# Patient Record
Sex: MALE | Race: WHITE | NOT HISPANIC OR LATINO | Employment: STUDENT | URBAN - METROPOLITAN AREA
[De-identification: names, ages, dates, MRNs, and addresses within clinical notes are randomized per-mention and may not be internally consistent; named-entity substitution may affect disease eponyms.]

---
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
## 2023-06-09 ENCOUNTER — APPOINTMENT (OUTPATIENT)
Dept: GENERAL RADIOLOGY | Facility: CLINIC | Age: 18
End: 2023-06-09
Attending: STUDENT IN AN ORGANIZED HEALTH CARE EDUCATION/TRAINING PROGRAM
Payer: COMMERCIAL

## 2023-06-09 ENCOUNTER — HOSPITAL ENCOUNTER (EMERGENCY)
Facility: CLINIC | Age: 18
Discharge: HOME | End: 2023-06-09
Attending: STUDENT IN AN ORGANIZED HEALTH CARE EDUCATION/TRAINING PROGRAM | Admitting: STUDENT IN AN ORGANIZED HEALTH CARE EDUCATION/TRAINING PROGRAM
Payer: COMMERCIAL

## 2023-06-09 VITALS
HEART RATE: 76 BPM | SYSTOLIC BLOOD PRESSURE: 127 MMHG | HEIGHT: 75 IN | TEMPERATURE: 97.8 F | DIASTOLIC BLOOD PRESSURE: 69 MMHG | RESPIRATION RATE: 16 BRPM

## 2023-06-09 DIAGNOSIS — S61.311A LACERATION OF LEFT INDEX FINGER WITHOUT FOREIGN BODY WITH DAMAGE TO NAIL, INITIAL ENCOUNTER: ICD-10-CM

## 2023-06-09 PROCEDURE — 12002 RPR S/N/AX/GEN/TRNK2.6-7.5CM: CPT | Performed by: STUDENT IN AN ORGANIZED HEALTH CARE EDUCATION/TRAINING PROGRAM

## 2023-06-09 PROCEDURE — 250N000011 HC RX IP 250 OP 636: Performed by: STUDENT IN AN ORGANIZED HEALTH CARE EDUCATION/TRAINING PROGRAM

## 2023-06-09 PROCEDURE — 90471 IMMUNIZATION ADMIN: CPT | Performed by: STUDENT IN AN ORGANIZED HEALTH CARE EDUCATION/TRAINING PROGRAM

## 2023-06-09 PROCEDURE — 73140 X-RAY EXAM OF FINGER(S): CPT | Mod: 26 | Performed by: RADIOLOGY

## 2023-06-09 PROCEDURE — 99283 EMERGENCY DEPT VISIT LOW MDM: CPT | Mod: 25 | Performed by: STUDENT IN AN ORGANIZED HEALTH CARE EDUCATION/TRAINING PROGRAM

## 2023-06-09 PROCEDURE — 99284 EMERGENCY DEPT VISIT MOD MDM: CPT | Mod: 25 | Performed by: STUDENT IN AN ORGANIZED HEALTH CARE EDUCATION/TRAINING PROGRAM

## 2023-06-09 PROCEDURE — 73140 X-RAY EXAM OF FINGER(S): CPT | Mod: LT

## 2023-06-09 PROCEDURE — 90715 TDAP VACCINE 7 YRS/> IM: CPT | Performed by: STUDENT IN AN ORGANIZED HEALTH CARE EDUCATION/TRAINING PROGRAM

## 2023-06-09 RX ORDER — DEXTROAMPHETAMINE SACCHARATE, AMPHETAMINE ASPARTATE, DEXTROAMPHETAMINE SULFATE AND AMPHETAMINE SULFATE 7.5; 7.5; 7.5; 7.5 MG/1; MG/1; MG/1; MG/1
40 TABLET ORAL DAILY
COMMUNITY

## 2023-06-09 RX ORDER — CEPHALEXIN 500 MG/1
500 CAPSULE ORAL 4 TIMES DAILY
Qty: 28 CAPSULE | Refills: 0 | Status: SHIPPED | OUTPATIENT
Start: 2023-06-09 | End: 2023-06-16

## 2023-06-09 RX ADMIN — CLOSTRIDIUM TETANI TOXOID ANTIGEN (FORMALDEHYDE INACTIVATED), CORYNEBACTERIUM DIPHTHERIAE TOXOID ANTIGEN (FORMALDEHYDE INACTIVATED), BORDETELLA PERTUSSIS TOXOID ANTIGEN (GLUTARALDEHYDE INACTIVATED), BORDETELLA PERTUSSIS FILAMENTOUS HEMAGGLUTININ ANTIGEN (FORMALDEHYDE INACTIVATED), BORDETELLA PERTUSSIS PERTACTIN ANTIGEN, AND BORDETELLA PERTUSSIS FIMBRIAE 2/3 ANTIGEN 0.5 ML: 5; 2; 2.5; 5; 3; 5 INJECTION, SUSPENSION INTRAMUSCULAR at 11:17

## 2023-06-09 ASSESSMENT — ACTIVITIES OF DAILY LIVING (ADL): ADLS_ACUITY_SCORE: 35

## 2023-06-09 NOTE — DISCHARGE INSTRUCTIONS
Thank you for coming to the [facility] emergency department.  You were seen for a laceration of your left index finger.  This is a deep laceration and essentially represented a partial amputation of the digit.  The wound was cleaned and closed.  You were started on prophylactic antibiotics.  You were referred to the hand specialist for wound follow-up.    Suture removal in 10-12 days. You can return to the ED for this or in be seen in clinic.    Hand Follow Up: The [facility]  will call you to coordinate your care as prescribed by your provider. A representative will call you within 2 business days to help you schedule your appointment, or you may contact the  Representative at: [phone].

## 2023-06-09 NOTE — ED PROVIDER NOTES
"    [facility]ERGENC[facility] DEPARTMENT ([facility])    6/09/23       ED PROVIDER NOTE    History     Chief Complaint   Patient presents with     Laceration     The history is provided by the patient and medical records.     Shyam Perez is a 17 year old male with no significant past medical history who presents to the ED for a laceration of the distal phalanx of the left index finger.  The patient was cutting onions last night when he accidentally cut the distal phalanx of his left index finger, partially on the palmar aspect with some injury to the nail. He bandaged the wound overnight. 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 They referred him to a UC/ED for further evaluation due to the depth of the wound. Here, he rates the pain as a 5/10.  There is some oozing present.  He denies history of bleeding disorders or other pertinent medical history.  He believes his tetanus is up-to-date. Per [facility] records, last Tdap was 06/30/2015. Knife was relatively clean, only used on an onion.    Past Medical History  No past medical history on file.  No past surgical history on file.  amphetamine-dextroamphetamine (ADDERALL) 30 MG tablet  cephALEXin (KEFLEX) 500 MG capsule      Allergies   Allergen Reactions     Sulfa Antibiotics Hives     Family History  No family history on file.  Social History       Past medical history, past surgical history, medications, allergies, family history, and social history were reviewed with the patient. No additional pertinent items.      A medically appropriate review of systems was performed with pertinent positives and negatives noted in the HPI, and all other systems negative.    Physical Exam   BP: 127/69  Pulse: 76  Temp: 97.8  F (36.6  C)  Resp: 16  Height: 190.5 cm (6' 3\")  Physical Exam  Vital Signs Reviewed  Gen: Well nourished, well developed, resting comfortably, no acute distress  HEENT: NC/AT, " PERRL, EOMI, MMM  Neck: Supple, FROM  CV: Regular Rate, no murmur/rub/gallop  Lungs/Chest: Normal Effort, CTAB  Abd: Non-distended, non-tender  MSK/Back: FROM.  Deep laceration to left fingertip.  Sensation grossly intact to the tip, but slightly diminished over the radial aspect of the pad.  Neuro: A&Ox3, GCS 15, CN II-XII unremarkable  Skin: Warm, Dry, Intact, no visible lesions    ED Course, Procedures, & Data       Patient seen and evaluated in triage room  Moved vertical triage for repair  Tetanus updated  X-ray obtained and reviewed  Referral placed to orthopedic hand clinic for follow-up  Wound care discussed  Stable to discharge with outpatient follow-up    [facility]    -Laceration Repair    Date/Time: 6/9/2023 10:21 AM    Performed by: Gil Clay MD  Authorized by: Gil Clay MD    Risks, benefits and alternatives discussed.      ANESTHESIA (see MAR for exact dosages):     Anesthesia method:  Nerve block    Block location:  Left 2nd digit - digital block    Block needle gauge:  27 G    Block anesthetic:  Lidocaine 1% w/o epi    Block technique:  Ring Block    Block injection procedure:  Anatomic landmarks identified, introduced needle, incremental injection and negative aspiration for blood    Block outcome:  Anesthesia achieved      LACERATION DETAILS     Location:  Finger    Finger location:  L index finger    Length (cm):  4    Depth (mm):  5    REPAIR TYPE:     Repair type:  Intermediate      EXPLORATION:     Hemostasis achieved with:  Direct pressure and tourniquet    Wound exploration: entire depth of wound probed and visualized      Contaminated: no      TREATMENT:     Area cleansed with:  Saline    Amount of cleaning:  Standard    Irrigation solution:  Sterile saline    Irrigation method:  Syringe    SKIN REPAIR     Repair method:  Sutures    Suture size:  4-0    Suture material:  Nylon    Suture technique:  Simple interrupted    Number  of sutures:  5 (4 in fingerpad, 1 in nailbed)    APPROXIMATION     Approximation:  Close    POST-PROCEDURE DETAILS     Dressing:  Non-adherent dressing, splint for protection and sterile dressing        PROCEDURE  Describe Procedure: Wound repaired with 4 nylon sutures in the finger pad, 1 through the nailbed.  Nailbed coated in skin adhesive afterwards for additional closure.  Patient Tolerance:  Patient tolerated the procedure well with no immediate complications                       No results found for any visits on 06/09/23.  Medications   lidocaine 1 % 2 mL (has no administration in time range)   Tdap (tetanus-diphtheria-acell pertussis) (ADACEL) injection 0.5 mL (has no administration in time range)   skin closure adhesive (DERMABOND) vial (has no administration in time range)     Labs Ordered and Resulted from Time of ED Arrival to Time of ED Departure - No data to display  Fingers XR, 2-3 views, left    (Results Pending)          Critical care was not performed.     Medical Decision Making  The patient's presentation was of moderate complexity (an acute complicated injury).    The patient's evaluation involved:  ordering and/or review of 1 test(s) in this encounter (see separate area of note for details)  independent interpretation of testing performed by another health professional (see separate area of note for details)    The patient's management necessitated moderate risk (prescription drug management including medications given in the ED) and moderate risk (a decision regarding minor procedure (laceration repair) with risk factors of none).      Assessment & Plan    Patient is an otherwise healthy 17-year-old male presenting the emergency department with a laceration to left fingertip.  Patient is right-hand dominant. He is a decathlete. He has no other injuries.  Vital signs are reassuring.  The laceration is deep and essentially represents a partial amputation of the radial aspect of the left second  fingertip.  Wound was cleaned and irrigated.  Closed as above.  Prophylactic antibiotic started.  I independently interpreted the x-ray and noted no obvious fracture/underlying bony image.  No radiopaque foreign bodies visualized. Final read pending.    Referral was placed to the orthopedic hand clinic for follow-up.  Wound care instructions provided.  Keflex prescribed.  Return precautions provided.    I have reviewed the nursing notes. I have reviewed the findings, diagnosis, plan and need for follow up with the patient.    New Prescriptions    CEPHALEXIN (KEFLEX) 500 MG CAPSULE    Take 1 capsule (500 mg) by mouth 4 times daily for 7 days       Final diagnoses:   Laceration of left index finger without foreign body with damage to nail, initial encounter     I, Tia Edwards, am serving as a trained medical scribe to document services personally performed by Gil Bentley MD based on the provider's statements to me on June 9, 2023.  This document has been checked and approved by the attending provider.    I, Gil Bentley MD, was physically present and have reviewed and verified the accuracy of this note documented by iTa Edwards, medical scribe.      Gil Bentley Jr., MD   [facility] EMERGENCY DEPARTMENT  6/9/2023     Gil Bentley MD  06/09/23 1057

## 2023-06-09 NOTE — ED TRIAGE NOTES
Triage Assessment     Row Name 06/09/23 0917       Triage Assessment (Adult)    Airway WDL WDL       Respiratory WDL    Respiratory WDL WDL       Skin Circulation/Temperature WDL    Skin Circulation/Temperature WDL WDL       Cardiac WDL    Cardiac WDL WDL       Peripheral/Neurovascular WDL    Peripheral Neurovascular WDL WDL       Cognitive/Neuro/Behavioral WDL    Cognitive/Neuro/Behavioral WDL WDL

## 2023-06-12 ENCOUNTER — TELEPHONE (OUTPATIENT)
Dept: OTHER | Age: 18
End: 2023-06-12

## 2023-06-12 NOTE — TELEPHONE ENCOUNTER
Hello,  I'm with ortho con.  Pt had a near amputation of his finger and needs stitches out in 10-12 days, laceration.  Pt would prefer [facility] over [facility].  Please review to find an appropriately timed appt for him as I'm only finding openings 7/3 and after.  Thank you

## 2023-06-12 NOTE — TELEPHONE ENCOUNTER
Spoke with patient.  He states that he has normal sensation in his finger, denies numbness or tingling.  He also states that he has completely normal function in his hand, can bend finger and normal ROM.  He has no concerns now.  Offered for patient to see surgeon if desired tomorrow at 10 AM since it was such a deep laceration, but patient declined for now.  He will contact us if anything becomes worse.   He was driving so will contact him again to set up a suture removal appointment. Stephie Stone RN

## 2023-06-13 NOTE — TELEPHONE ENCOUNTER
Spoke with patient. Informed him that after consultation with Geneva Angeles PA-C, he can see his primary MD for wound check and suture removal.  If he came here just for that he would be billed as a surgical consult.  Patient agrees and doesn't believe he needs surgical intervention at all.  He will call his PCP now for appointment. Stephie Stone RN